# Patient Record
Sex: MALE | Race: WHITE | NOT HISPANIC OR LATINO | URBAN - METROPOLITAN AREA
[De-identification: names, ages, dates, MRNs, and addresses within clinical notes are randomized per-mention and may not be internally consistent; named-entity substitution may affect disease eponyms.]

---

## 2017-01-25 NOTE — PROCEDURE NOTE: CLINICAL
PROCEDURE NOTE: Focal Laser, Retina OD. Diagnosis: Branch Retinal Vein Occlusion with Macular Edema. Anesthesia: Topical. Prior to focal laser, risks/benefits/alternatives to laser discussed including loss of vision and patient wished to proceed. An informed consent was obtained and no assurances or guarantees were given. Spot size: 50 um. Power: 130 mW. Number of pulses: 21. Pulse duration: 50 ms. Procedure Time: 1:26PM. Patient tolerated procedure well. There were no complications. Post procedure instructions given. Patient given office phone number/answering service number and advised to call immediately should there be loss of vision or pain, or should they have any other questions or concerns. Lori Knight

## 2017-02-09 NOTE — PROCEDURE NOTE: CLINICAL
PROCEDURE NOTE: Lucentis 0.5 mg OD. Diagnosis: Branch Retinal Vein Occlusion with Macular Edema. Anesthesia: Akten Gel 3.5%. Prep: Betadine Flush. Prior to injection, risks/benefits/alternatives discussed including infection, loss of vision, hemorrhage, cataract, glaucoma, retinal tears or detachment and patient wished to proceed. Topical anesthesia was induced with Alcaine. Additional anesthesia was achieved using drop(s) or injection checked above. A drop of Povidone-iodine 5% ophthalmic solution was instilled over the injection site and in the inferior fornix. Betadine prep was performed. A single use vial of intravitreal Lucentis 0.5mg/0.05ml was used and excess discarded. The needle was passed 3.0 mm posterior to the limbus in pseudophakic patients, and 3.5 mm posterior to the limbus in phakic patients. The remainder of the Lucentis 0.5mg in the single-use vial was then discarded in a medical waste disposal container. The eye was irrigated with sterile irrigating solution. Patient tolerated the procedure well. There were no complications. Post procedure instructions given. Injection Time *. CF vision checked. The patient was instructed to return for re-evaluation in approximately 4-12 weeks depending on his/her condition and was told to call immediately if vision decreases and/or if his/her eye becomes red, painful, and/or light sensitive. The patient was instructed to go to the emergency room or call 911 if unable to reach the doctor within an hour or two of trying or calling. The patient was instructed to use Artificial Tears q.i.d. p.r.n for comfort. Kehinde Rubio

## 2017-04-20 NOTE — PATIENT DISCUSSION
LUCENTIS AND REPEAT IN 8 WKS - INCREASED EDEMA AT 10 WKS, DID BETTER WITH 8 WKS ON 1 25 17, THEREFORE RETX AND REDUCE F/U TO 8 WKS.

## 2017-04-20 NOTE — PATIENT DISCUSSION
ON 1 25 17 FOCAL THEN LUCENTIS IN 2  WK AND OCT - TRACE EDEMA 8 WKS - IF NO EDEMA REEVAL 3 MONTHS LATER.

## 2017-04-20 NOTE — PROCEDURE NOTE: CLINICAL
PROCEDURE NOTE: Lucentis 0.5 mg OD. Diagnosis: Branch Retinal Vein Occlusion with Macular Edema. Anesthesia: Akten Gel 3.5%. Prep: Betadine Flush. Prior to injection, risks/benefits/alternatives discussed including infection, loss of vision, hemorrhage, cataract, glaucoma, retinal tears or detachment and patient wished to proceed. Topical anesthesia was induced with Alcaine. Additional anesthesia was achieved using drop(s) or injection checked above. A drop of Povidone-iodine 5% ophthalmic solution was instilled over the injection site and in the inferior fornix. Betadine prep was performed. A single use vial of intravitreal Lucentis 0.5mg/0.05ml was used and excess discarded. The needle was passed 3.0 mm posterior to the limbus in pseudophakic patients, and 3.5 mm posterior to the limbus in phakic patients. The remainder of the Lucentis 0.5mg in the single-use vial was then discarded in a medical waste disposal container. The eye was irrigated with sterile irrigating solution. Patient tolerated the procedure well. There were no complications. Post procedure instructions given. Injection Time *. CF vision checked. The patient was instructed to return for re-evaluation in approximately 4-12 weeks depending on his/her condition and was told to call immediately if vision decreases and/or if his/her eye becomes red, painful, and/or light sensitive. The patient was instructed to go to the emergency room or call 911 if unable to reach the doctor within an hour or two of trying or calling. The patient was instructed to use Artificial Tears q.i.d. p.r.n for comfort. Kiel Sinha

## 2017-08-15 NOTE — PATIENT DISCUSSION
ON 4 20 17 LUCENTIS AND REPEAT IN 8 WKS - INCREASED EDEMA AT 10 WKS, DID BETTER WITH 8 WKS ON 1 25 17, THEREFORE RETX AND REDUCE F/U TO 8 WKS.

## 2017-11-20 NOTE — PROCEDURE NOTE: CLINICAL
PROCEDURE NOTE: Focal Laser, Retina OD. Diagnosis: Branch Retinal Vein Occlusion with Macular Edema. Anesthesia: Topical. Prior to focal laser, risks/benefits/alternatives to laser discussed including loss of vision and patient wished to proceed. An informed consent was obtained and no assurances or guarantees were given. Spot size: 50 um. Power: 110 mW. Number of pulses: 16. Pulse duration: 50 ms. Procedure Time: 1123AM. Patient tolerated procedure well. There were no complications. Post procedure instructions given. Patient given office phone number/answering service number and advised to call immediately should there be loss of vision or pain, or should they have any other questions or concerns. Oral Agrawal

## 2017-12-18 NOTE — PROCEDURE NOTE: CLINICAL
PROCEDURE NOTE: Lucentis 0.5mg PFS OD. Diagnosis: Branch Retinal Vein Occlusion with Macular Edema. Anesthesia: Akten Gel 3.5%. Prep: Betadine Flush. Prior to injection, risks/benefits/alternatives discussed including but not limited to infection, loss of vision or eye, hemorrhage, cataract, glaucoma, retinal tears or detachment. The patient wished to proceed with treatment. Topical anesthesia was induced with Alcaine. Additional anesthesia was achieved using drop(s) or injection checked above. A drop of Povidone-iodine 5% ophthalmic solution was instilled over the injection site and in the inferior fornix. Betadine prep was performed. A single use prefilled syringe of intravitreal Lucentis 0.5mg/0.05ml was used and excess discarded. The needle was passed 3.0 mm posterior to the limbus in pseudophakic patients, and 3.5 mm posterior to the limbus in phakic patients. The remainder of the Lucentis 0.5mg in the single-use vial was then discarded in a medical waste disposal container. The eye was irrigated with sterile irrigating solution. Patient tolerated the procedure well. There were no complications. Post procedure instructions given. CF vision checked. Injection Time *. The patient was instructed to return for re-evaluation in approximately 4-12 weeks depending on his/her condition and was told to call immediately if vision decreases and/or if his/her eye becomes red, painful, and/or light sensitive. The patient was instructed to go to the emergency room or call 911 if unable to reach the doctor within an hour or two of trying or calling. Georgia Chnael

## 2018-01-29 NOTE — PATIENT DISCUSSION
[facility] DROPPED FROM 20/25 TO 20/50 AFTER TRUAMA - PT INDICATES [facility] FLUCTUATES SUGGESTING VIT DREBRIS IS CONTRIBUTING.

## 2018-01-29 NOTE — PROCEDURE NOTE: CLINICAL
PROCEDURE NOTE: Lucentis 0.5mg PFS OD. Diagnosis: Branch Retinal Vein Occlusion with Macular Edema. Anesthesia: Topical. Prep: Betadine Flush. Prior to injection, risks/benefits/alternatives discussed including but not limited to infection, loss of vision or eye, hemorrhage, cataract, glaucoma, retinal tears or detachment. The patient wished to proceed with treatment. Topical anesthesia was induced with Alcaine. Additional anesthesia was achieved using drop(s) or injection checked above. A drop of Povidone-iodine 5% ophthalmic solution was instilled over the injection site and in the inferior fornix. Betadine prep was performed. A single use prefilled syringe of intravitreal Lucentis 0.5mg/0.05ml was used and excess discarded. The needle was passed 3.0 mm posterior to the limbus in pseudophakic patients, and 3.5 mm posterior to the limbus in phakic patients. The remainder of the Lucentis 0.5mg in the single-use vial was then discarded in a medical waste disposal container. The eye was irrigated with sterile irrigating solution. Patient tolerated the procedure well. There were no complications. Post procedure instructions given. CF vision checked. Injection Time 12:20PM. The patient was instructed to return for re-evaluation in approximately 4-12 weeks depending on his/her condition and was told to call immediately if vision decreases and/or if his/her eye becomes red, painful, and/or light sensitive. The patient was instructed to go to the emergency room or call 911 if unable to reach the doctor within an hour or two of trying or calling. Brea Cabezas

## 2018-03-08 NOTE — PROCEDURE NOTE: CLINICAL
PROCEDURE NOTE: Lucentis 0.5mg PFS OD. Diagnosis: Branch Retinal Vein Occlusion with Macular Edema. Anesthesia: Topical. Prep: Betadine Flush. Prior to injection, risks/benefits/alternatives discussed including but not limited to infection, loss of vision or eye, hemorrhage, cataract, glaucoma, retinal tears or detachment. The patient wished to proceed with treatment. Topical anesthesia was induced with Alcaine. Additional anesthesia was achieved using drop(s) or injection checked above. A drop of Povidone-iodine 5% ophthalmic solution was instilled over the injection site and in the inferior fornix. Betadine prep was performed. A single use prefilled syringe of intravitreal Lucentis 0.5mg/0.05ml was used and excess discarded. The needle was passed 3.0 mm posterior to the limbus in pseudophakic patients, and 3.5 mm posterior to the limbus in phakic patients. The remainder of the Lucentis 0.5mg in the single-use vial was then discarded in a medical waste disposal container. The eye was irrigated with sterile irrigating solution. Patient tolerated the procedure well. There were no complications. Post procedure instructions given. CF vision checked. Injection Time 228PM. The patient was instructed to return for re-evaluation in approximately 4-12 weeks depending on his/her condition and was told to call immediately if vision decreases and/or if his/her eye becomes red, painful, and/or light sensitive. The patient was instructed to go to the emergency room or call 911 if unable to reach the doctor within an hour or two of trying or calling. Cyril Elizabeth

## 2018-04-30 NOTE — PATIENT DISCUSSION
FOCAL AS SIG EXTRAFOVEAL COMPONENT THEN LUCENTIS - MILD EDEMA AT 8 WKS - INCREASED SINCE 1 29 18 AT 6 WKS - TO RETX WITH LASER THEN ANTI VEGF THEN REEVAL IN [place].

## 2018-04-30 NOTE — PROCEDURE NOTE: CLINICAL
PROCEDURE NOTE: Focal Laser, Retina OD. Diagnosis: Branch Retinal Vein Occlusion with Macular Edema. Anesthesia: Topical. Prior to focal laser, risks/benefits/alternatives to laser discussed including loss of vision and patient wished to proceed. An informed consent was obtained and no assurances or guarantees were given. Spot size: 50 um. Power: 50 mW. Number of pulses: 9. Pulse duration: 50 ms. Procedure Time: 12:26. Patient tolerated procedure well. There were no complications. Post procedure instructions given. Patient given office phone number/answering service number and advised to call immediately should there be loss of vision or pain, or should they have any other questions or concerns. Amber Ramachandran

## 2018-04-30 NOTE — PATIENT DISCUSSION
STILL HAS FLOATER SINCE THE TRAUMA - LITTLE HARDER TO READ STILL 4 30 18 - BUT HAS IMPROVED SOME AS NOW [facility] 20/30.

## 2018-04-30 NOTE — PATIENT DISCUSSION
[facility] DROPPED FROM 20/25 TO 20/50 ON 12 18 17 - AFTER TRUAMA - PT INDICATES [facility] FLUCTUATES SUGGESTING VIT DEBRIS IS CONTRIBUTING.

## 2018-04-30 NOTE — PATIENT DISCUSSION
IN DECEMBER 2018 PT HAD BUNGEE CORD INJURY AND MAC EDEMA RECCURED - AND HE HAS REQUIRED TREATMENT SINCE THEN.

## 2018-05-02 NOTE — PROCEDURE NOTE: CLINICAL
PROCEDURE NOTE: Lucentis 0.5mg PFS OD. Diagnosis: Branch Retinal Vein Occlusion with Macular Edema. Anesthesia: Akten Gel 3.5%. Prep: Betadine Flush. Prior to injection, risks/benefits/alternatives discussed including but not limited to infection, loss of vision or eye, hemorrhage, cataract, glaucoma, retinal tears or detachment. The patient wished to proceed with treatment. Topical anesthesia was induced with Alcaine. Additional anesthesia was achieved using drop(s) or injection checked above. A drop of Povidone-iodine 5% ophthalmic solution was instilled over the injection site and in the inferior fornix. Betadine prep was performed. A single use prefilled syringe of intravitreal Lucentis 0.5mg/0.05ml was used and excess discarded. The needle was passed 3.0 mm posterior to the limbus in pseudophakic patients, and 3.5 mm posterior to the limbus in phakic patients. The remainder of the Lucentis 0.5mg in the single-use vial was then discarded in a medical waste disposal container. The eye was irrigated with sterile irrigating solution. Patient tolerated the procedure well. There were no complications. Post procedure instructions given. CF vision checked. Injection Time *. The patient was instructed to return for re-evaluation in approximately 4-12 weeks depending on his/her condition and was told to call immediately if vision decreases and/or if his/her eye becomes red, painful, and/or light sensitive. The patient was instructed to go to the emergency room or call 911 if unable to reach the doctor within an hour or two of trying or calling. Adriel Gasca

## 2018-07-19 NOTE — PATIENT DISCUSSION
FOCAL AS SIG EXTRAFOVEAL COMPONENT THEN LUCENTIS AND REEVAL IN 3 MONTHS - MILD EDEMA AT 11 WKS - INCREASED SINCE 4 30 18  - TO RETX WITH LASER THEN ANTI VEGF AND REEVAL IN 3 MONTHS.

## 2018-07-19 NOTE — PROCEDURE NOTE: CLINICAL
PROCEDURE NOTE: Focal Laser, Retina OD. Diagnosis: Branch Retinal Vein Occlusion with Macular Edema. Anesthesia: Topical. Prior to focal laser, risks/benefits/alternatives to laser discussed including loss of vision and patient wished to proceed. An informed consent was obtained and no assurances or guarantees were given. Spot size: 50 um. Power: 90 mW. Number of pulses: 12. Pulse duration: 50 ms. Procedure Time: 12:27 PM. Patient tolerated procedure well. There were no complications. Post procedure instructions given. Patient given office phone number/answering service number and advised to call immediately should there be loss of vision or pain, or should they have any other questions or concerns. Tania Taylor

## 2018-07-23 NOTE — PROCEDURE NOTE: CLINICAL
PROCEDURE NOTE: Lucentis 0.5mg PFS OD. Diagnosis: Branch Retinal Vein Occlusion with Macular Edema. Anesthesia: Akten Gel 3.5%. Prep: Betadine Flush. Prior to injection, risks/benefits/alternatives discussed including but not limited to infection, loss of vision or eye, hemorrhage, cataract, glaucoma, retinal tears or detachment. The patient wished to proceed with treatment. Topical anesthesia was induced with Alcaine. Additional anesthesia was achieved using drop(s) or injection checked above. A drop of Povidone-iodine 5% ophthalmic solution was instilled over the injection site and in the inferior fornix. Betadine prep was performed. A single use prefilled syringe of intravitreal Lucentis 0.5mg/0.05ml was used and excess discarded. The needle was passed 3.0 mm posterior to the limbus in pseudophakic patients, and 3.5 mm posterior to the limbus in phakic patients. The remainder of the Lucentis 0.5mg in the single-use vial was then discarded in a medical waste disposal container. The eye was irrigated with sterile irrigating solution. Patient tolerated the procedure well. There were no complications. Post procedure instructions given. CF vision checked. Injection Time 2:10 PM. The patient was instructed to return for re-evaluation in approximately 4-12 weeks depending on his/her condition and was told to call immediately if vision decreases and/or if his/her eye becomes red, painful, and/or light sensitive. The patient was instructed to go to the emergency room or call 911 if unable to reach the doctor within an hour or two of trying or calling. Samira Middleton

## 2018-11-19 NOTE — PROCEDURE NOTE: CLINICAL
PROCEDURE NOTE: Focal Laser, Retina OD. Diagnosis: Branch Retinal Vein Occlusion with Macular Edema. Anesthesia: Topical. Prior to focal laser, risks/benefits/alternatives to laser discussed including loss of vision and patient wished to proceed. An informed consent was obtained and no assurances or guarantees were given. Spot size: 50 um. Power: 90 mW. Number of pulses: 11. Pulse duration: 50 ms. Procedure Time: 1119AM. Patient tolerated procedure well. There were no complications. Post procedure instructions given. Patient given office phone number/answering service number and advised to call immediately should there be loss of vision or pain, or should they have any other questions or concerns. Ke Hernandes

## 2018-11-19 NOTE — PATIENT DISCUSSION
STILL HAS DIF WITH HIS VISION - NOW HAVING HARDER AND HARDER TIME WITH READING AND DISTANCE - RECOM F/U DR GRIMALDO TO SEE IF GLASSES OR LASIX WOULD HELP.

## 2019-04-18 NOTE — PATIENT DISCUSSION
CONTINUES TO HAVE DIF WITH VISION OD - PT FINDS GLASSES HELP ON AN AS NEEDED BASIS - NO SIG EDEMA SEEN TODAY.

## 2019-11-26 NOTE — PATIENT DISCUSSION
"PT WAS TOLD DR GRIMALDO COULD DO A LATER TO ""TWEEK"" HIS PRESCRIPTION BUT IT WAS A SMALL AMOUNT AND PATIENT ELECTED NOT TO HAVE IT DONE. "

## 2021-01-07 NOTE — PATIENT DISCUSSION
Recommend OBSERVATION and continued MONITORING for progression. Purse String (Intermediate) Text: Given the location of the defect and the characteristics of the surrounding skin a purse string intermediate closure was deemed most appropriate.  Undermining was performed circumfirentially around the surgical defect.  A purse string suture was then placed and tightened.

## 2021-07-08 NOTE — PATIENT DISCUSSION
Problem: Adult Behavioral Health Plan of Care  Goal: Plan of Care Review  Outcome: Ongoing, Progressing  Flowsheets (Taken 7/8/2021 1628)  Consent Given to Review Plan with: Guardian Jimena Henderson  Progress: no change  Plan of Care Reviewed With: patient  Patient Agreement with Plan of Care: agrees  Outcome Summary: New admit. Completed social history and integrated summary  Goal: Patient-Specific Goal (Individualization)  Outcome: Ongoing, Progressing  Flowsheets  Taken 7/8/2021 1628 by Tawnya Arango  Patient-Specific Goals (Include Timeframe): Patient will deny SI/HI prior to discharge. Patient will demonstrate improved behaviors prior to discharge.  Individualized Care Needs: Therapist will offer 1-4 individual sessions, family education, aftercare planning  Taken 7/8/2021 1555 by Tawnya Arango  Patient Personal Strengths:   community support   stable living environment  Patient Vulnerabilities: lacks insight into illness  Taken 7/8/2021 1535 by Karol Alfonso, RN  Anxieties, Fears or Concerns: none reported  Goal: Optimized Coping Skills in Response to Life Stressors  Outcome: Ongoing, Progressing  Intervention: Promote Effective Coping Strategies  Flowsheets (Taken 7/8/2021 1535 by Karol Alfonso, RN)  Supportive Measures:   active listening utilized   positive reinforcement provided   self-responsibility promoted   verbalization of feelings encouraged  Goal: Develops/Participates in Therapeutic [place] to Support Successful Transition  Outcome: Ongoing, Progressing  Intervention: Foster Therapeutic [place]  Flowsheets (Taken 7/8/2021 1535 by Karol Alfonso, RN)  Trust Relationship/Rapport:   care explained   choices provided   emotional support provided   empathic listening provided   questions encouraged   questions answered   reassurance provided   thoughts/feelings acknowledged  Intervention: Mutually Develop Transition Plan  Flowsheets  Taken 7/8/2021  ERM does NOT APPEAR VISUALLY SIGNIFICANT. 1629  Outpatient/Agency/Support Group Needs:   outpatient medication management   outpatient psychiatric care (specify)  Transition Support:   community resources reviewed   crisis management plan verbalized   follow-up care coordinated   crisis management plan promoted   follow-up care discussed  Anticipated Discharge Disposition: group home  Taken 7/8/2021 1625  Discharge Coordination/Progress: Patient has insurance and will return to [facility]  Concerns Comments: NA  Transportation Anticipated: agency  Transportation Concerns: car, none  Current Discharge Risk: psychiatric illness  Concerns to be Addressed:   mental health   coping/stress   discharge planning  Readmission Within the Last 30 Days: no previous admission in last 30 days  Patient/Family Anticipated Services at Transition:   mental health services   outpatient care  Patient's Choice of Community Agency(s): [facility]  Patient/Family Anticipates Transition to: home  Offered/Gave Vendor List: no   Goal Outcome Evaluation:  Plan of Care Reviewed With: patient  Patient Agreement with Plan of Care: agrees  Consent Given to Review Plan with: Guardian Jimena Henderson  Progress: no change  Outcome Summary: New admit. Completed social history and integrated summary

## 2022-03-14 ENCOUNTER — COMPREHENSIVE EXAM (OUTPATIENT)
Dept: URBAN - METROPOLITAN AREA CLINIC 16 | Facility: CLINIC | Age: 62
End: 2022-03-14

## 2022-03-14 DIAGNOSIS — H02.883: ICD-10-CM

## 2022-03-14 DIAGNOSIS — Z01.00: ICD-10-CM

## 2022-03-14 DIAGNOSIS — H52.01: ICD-10-CM

## 2022-03-14 DIAGNOSIS — H52.12: ICD-10-CM

## 2022-03-14 DIAGNOSIS — H52.203: ICD-10-CM

## 2022-03-14 DIAGNOSIS — H25.13: ICD-10-CM

## 2022-03-14 DIAGNOSIS — H52.4: ICD-10-CM

## 2022-03-14 PROCEDURE — 92004 COMPRE OPH EXAM NEW PT 1/>: CPT

## 2022-03-14 PROCEDURE — 92015 DETERMINE REFRACTIVE STATE: CPT

## 2022-03-14 ASSESSMENT — TONOMETRY
OD_IOP_MMHG: 10
OS_IOP_MMHG: 10

## 2022-03-14 ASSESSMENT — KERATOMETRY
OD_K2POWER_DIOPTERS: 44.75
OS_K1POWER_DIOPTERS: 44.50
OS_K2POWER_DIOPTERS: 45.00
OS_AXISANGLE_DEGREES: 56
OD_K1POWER_DIOPTERS: 44.50
OD_AXISANGLE2_DEGREES: 81
OS_AXISANGLE2_DEGREES: 146
OD_AXISANGLE_DEGREES: 171

## 2022-03-14 ASSESSMENT — VISUAL ACUITY
OS_SC: 20/40-1
OD_SC: 20/25-1
OU_SC: 20/30-1

## 2022-03-26 NOTE — PATIENT DISCUSSION
03/25/22 1810   Clinical Encounter Type   Visited With Patient   Routine Visit Introduction  (Consult)   Patient Spiritual Encounters   Spiritual Assessment Completed Yes   Taxonomy   Intended Effects Demonstrate caring and concern   Methods Offer support   Interventions Active listening     Pt sitting in chair watching TV; indicated no spiritual or emotional needs at this time. BMI Within normal limits, continue current management.

## 2022-06-20 NOTE — PATIENT DISCUSSION
NO PROGRESSION ON OCT. Prednisone Counseling:  I discussed with the patient the risks of prolonged use of prednisone including but not limited to weight gain, insomnia, osteoporosis, mood changes, diabetes, susceptibility to infection, glaucoma and high blood pressure.  In cases where prednisone use is prolonged, patients should be monitored with blood pressure checks, serum glucose levels and an eye exam.  Additionally, the patient may need to be placed on GI prophylaxis, PCP prophylaxis, and calcium and vitamin D supplementation and/or a bisphosphonate.  The patient verbalized understanding of the proper use and the possible adverse effects of prednisone.  All of the patient's questions and concerns were addressed.

## 2023-04-04 NOTE — PATIENT DISCUSSION
FOCAL 11 20 17 - MILD EXTRAFOVEAL EDEMA - LITTLE MORE LIPID - TO TX LASER AND REEVAL IN 4 MONTHS. [facility]

## 2023-05-08 ENCOUNTER — COMPREHENSIVE EXAM (OUTPATIENT)
Dept: URBAN - METROPOLITAN AREA CLINIC 16 | Facility: CLINIC | Age: 63
End: 2023-05-08

## 2023-05-08 DIAGNOSIS — H02.886: ICD-10-CM

## 2023-05-08 DIAGNOSIS — H52.4: ICD-10-CM

## 2023-05-08 DIAGNOSIS — Z01.00: ICD-10-CM

## 2023-05-08 DIAGNOSIS — H52.12: ICD-10-CM

## 2023-05-08 DIAGNOSIS — H25.13: ICD-10-CM

## 2023-05-08 DIAGNOSIS — H52.01: ICD-10-CM

## 2023-05-08 DIAGNOSIS — H52.223: ICD-10-CM

## 2023-05-08 DIAGNOSIS — H02.883: ICD-10-CM

## 2023-05-08 PROCEDURE — 92015 DETERMINE REFRACTIVE STATE: CPT

## 2023-05-08 PROCEDURE — 92014 COMPRE OPH EXAM EST PT 1/>: CPT

## 2023-05-08 ASSESSMENT — VISUAL ACUITY
OD_SC: 20/25
OU_SC: 20/30
OS_SC: 20/40

## 2023-05-08 ASSESSMENT — KERATOMETRY
OD_K1POWER_DIOPTERS: 44.50
OD_AXISANGLE_DEGREES: 171
OS_K1POWER_DIOPTERS: 44.50
OS_AXISANGLE_DEGREES: 56
OS_K2POWER_DIOPTERS: 45.00
OD_AXISANGLE2_DEGREES: 81
OS_AXISANGLE2_DEGREES: 146
OD_K2POWER_DIOPTERS: 44.75

## 2023-05-08 ASSESSMENT — TONOMETRY
OS_IOP_MMHG: 14
OD_IOP_MMHG: 14

## 2024-05-06 ASSESSMENT — KERATOMETRY
OD_K1POWER_DIOPTERS: 44.50
OD_AXISANGLE_DEGREES: 171
OS_K2POWER_DIOPTERS: 45.00
OS_K1POWER_DIOPTERS: 44.50
OS_AXISANGLE2_DEGREES: 146
OS_AXISANGLE_DEGREES: 56
OD_K2POWER_DIOPTERS: 44.75
OD_AXISANGLE2_DEGREES: 81

## 2024-05-13 ENCOUNTER — PREPPED CHART (OUTPATIENT)
Dept: URBAN - METROPOLITAN AREA CLINIC 16 | Facility: CLINIC | Age: 64
End: 2024-05-13

## 2024-05-13 DIAGNOSIS — H52.01: ICD-10-CM

## 2024-05-13 DIAGNOSIS — H52.12: ICD-10-CM

## 2024-05-13 DIAGNOSIS — H02.883: ICD-10-CM

## 2024-05-13 DIAGNOSIS — H52.223: ICD-10-CM

## 2024-05-13 DIAGNOSIS — Z01.00: ICD-10-CM

## 2024-05-13 DIAGNOSIS — H52.4: ICD-10-CM

## 2024-05-13 DIAGNOSIS — H02.886: ICD-10-CM

## 2024-05-13 DIAGNOSIS — H25.13: ICD-10-CM

## 2024-05-13 PROCEDURE — 92014 COMPRE OPH EXAM EST PT 1/>: CPT

## 2024-05-13 PROCEDURE — 92015 DETERMINE REFRACTIVE STATE: CPT

## 2024-10-22 NOTE — PATIENT DISCUSSION
"Injection Order (copy and paste all info under \"order questions\" section:   My clinical question is:     lower back pain, chronic    Reason for Referral:  Procedure Order    Procedure:  Injection to be Determined by Pain Management Specialist    Scheduling Instructions: [facility] will call you to coordinate care as prescribed your provider. If you don t hear from a representative within 2 business days, please call [phone].    Additional Information:  Injections every 6 months with Dr. Alvarado?      Associated Diagnosis: Chronic midline low back pain without sciatica [M54.50, G89.29]  Chronic bilateral thoracic back pain [M54.6, G89.29]    Referring Provider: Ghanshyam Dixon APRN CNP in [facility]/    Injection History (previous injections with pain, type and date): Yes, KATIA 04/2024 w/Dr. Alvarado    After Review please route to Pain Nurse Pool for nursing to triage.    Ijeoma Perez      [facility]  Pain Management    " No retinal holes or tears seen on exam. Recommended OBSERVATION. We reviewed the signs and symptoms of retinal tear/retinal detachment and the importance of prompt evaluation should there be increasing floaters, new flashing lights, or decreasing peripheral vision in either eye at any time. Patient understands condition, prognosis and need for follow up care.